# Patient Record
Sex: MALE
[De-identification: names, ages, dates, MRNs, and addresses within clinical notes are randomized per-mention and may not be internally consistent; named-entity substitution may affect disease eponyms.]

---

## 2023-10-03 ENCOUNTER — NURSE TRIAGE (OUTPATIENT)
Dept: OTHER | Facility: CLINIC | Age: 23
End: 2023-10-03

## 2023-10-03 NOTE — TELEPHONE ENCOUNTER
Location of patient: [facility]    Practice Name: [facility]    Provider Name: Craige Kehr, MD    Subjective: Caller states \"fever \"     Current Symptoms: fever, sneezing , mild cough , body aches , no n/v/d, nasal mucus , h/a, hx asthma , glucose phosphate deficiency , no covid test , no cp no sob     Associated Symptoms: NA    Pain Severity:     Temperature: 102       What has been tried: alkaseltzer     Recommended disposition: See PCP within 24 Hours    Care advice provided, patient verbalizes understanding; denies any other questions or concerns.     Outcome: to be seen in 24 hours       This triage is a result of a call to the [employer]      Reason for Disposition   Fever present > 3 days (72 hours)    Protocols used: [facility]